# Patient Record
Sex: MALE | Race: WHITE | NOT HISPANIC OR LATINO | URBAN - METROPOLITAN AREA
[De-identification: names, ages, dates, MRNs, and addresses within clinical notes are randomized per-mention and may not be internally consistent; named-entity substitution may affect disease eponyms.]

---

## 2019-07-23 ENCOUNTER — IMPORTED ENCOUNTER (OUTPATIENT)
Dept: URBAN - METROPOLITAN AREA CLINIC 50 | Facility: CLINIC | Age: 59
End: 2019-07-23

## 2019-07-24 ENCOUNTER — IMPORTED ENCOUNTER (OUTPATIENT)
Dept: URBAN - METROPOLITAN AREA CLINIC 50 | Facility: CLINIC | Age: 59
End: 2019-07-24

## 2019-10-03 ENCOUNTER — IMPORTED ENCOUNTER (OUTPATIENT)
Dept: URBAN - METROPOLITAN AREA CLINIC 50 | Facility: CLINIC | Age: 59
End: 2019-10-03

## 2020-06-01 ENCOUNTER — IMPORTED ENCOUNTER (OUTPATIENT)
Dept: URBAN - METROPOLITAN AREA CLINIC 50 | Facility: CLINIC | Age: 60
End: 2020-06-01

## 2020-10-07 ENCOUNTER — IMPORTED ENCOUNTER (OUTPATIENT)
Dept: URBAN - METROPOLITAN AREA CLINIC 50 | Facility: CLINIC | Age: 60
End: 2020-10-07

## 2021-04-17 ASSESSMENT — VISUAL ACUITY
OS_CC: 20/20-1
OD_CC: 20/20
OS_CC: 20/25
OD_CC: 20/20

## 2021-04-17 ASSESSMENT — TONOMETRY
OS_IOP_MMHG: 16
OS_IOP_MMHG: 14
OD_IOP_MMHG: 16
OD_IOP_MMHG: 14

## 2021-12-09 ENCOUNTER — COMPREHENSIVE EXAM (OUTPATIENT)
Dept: URBAN - METROPOLITAN AREA CLINIC 49 | Facility: CLINIC | Age: 61
End: 2021-12-09

## 2021-12-09 DIAGNOSIS — H53.40: ICD-10-CM

## 2021-12-09 DIAGNOSIS — H25.13: ICD-10-CM

## 2021-12-09 DIAGNOSIS — H30.123: ICD-10-CM

## 2021-12-09 DIAGNOSIS — Z01.01: ICD-10-CM

## 2021-12-09 PROCEDURE — 92015 DETERMINE REFRACTIVE STATE: CPT

## 2021-12-09 PROCEDURE — 92014 COMPRE OPH EXAM EST PT 1/>: CPT

## 2021-12-09 ASSESSMENT — TONOMETRY
OD_IOP_MMHG: 13
OS_IOP_MMHG: 15

## 2021-12-09 ASSESSMENT — VISUAL ACUITY
OD_GLARE: 20/40
OD_PH: 20/30
OU_CC: J1+@16"
OD_GLARE: 20/60
OS_GLARE: 20/40
OS_GLARE: 20/30
OS_CC: 20/25-2
OD_CC: 20/40-2

## 2021-12-09 NOTE — PATIENT DISCUSSION
Discussed possible referral to [facility] for further evaluation by a neuro ophthalmologist to consider ordering a MRA or MRB. Will old off at this time.

## 2022-02-15 ENCOUNTER — DIAGNOSTICS ONLY (OUTPATIENT)
Dept: URBAN - METROPOLITAN AREA CLINIC 49 | Facility: CLINIC | Age: 62
End: 2022-02-15

## 2022-02-15 DIAGNOSIS — H53.40: ICD-10-CM

## 2022-02-15 PROCEDURE — 92083 EXTENDED VISUAL FIELD XM: CPT

## 2022-02-15 NOTE — PATIENT DISCUSSION
Discussed possible referral to [facility] for further evaluation by a neuro ophthalmologist to consider ordering a MRA or MRB. Will old off at this time.

## 2022-06-09 ENCOUNTER — COMPREHENSIVE EXAM (OUTPATIENT)
Dept: URBAN - METROPOLITAN AREA CLINIC 49 | Facility: CLINIC | Age: 62
End: 2022-06-09

## 2022-06-09 DIAGNOSIS — H53.40: ICD-10-CM

## 2022-06-09 DIAGNOSIS — H30.123: ICD-10-CM

## 2022-06-09 PROCEDURE — 92014 COMPRE OPH EXAM EST PT 1/>: CPT

## 2022-06-09 ASSESSMENT — VISUAL ACUITY
OD_CC: 20/20-2
OU_SC: J1+ @ 14"
OS_CC: 20/20

## 2022-06-09 ASSESSMENT — TONOMETRY
OS_IOP_MMHG: 14
OD_IOP_MMHG: 15

## 2022-06-09 NOTE — PATIENT DISCUSSION
Discussed possible referral to [facility] for further evaluation by a neuro ophthalmologist to consider ordering a MRA or MRB. Will old off at this time.

## 2022-06-09 NOTE — PATIENT DISCUSSION
HVF from 2/15/22 was unreliable. Repeat HVF next available. Patient will be called with the results.

## 2022-07-27 ENCOUNTER — DIAGNOSTICS ONLY (OUTPATIENT)
Dept: URBAN - METROPOLITAN AREA CLINIC 49 | Facility: CLINIC | Age: 62
End: 2022-07-27

## 2022-07-27 DIAGNOSIS — H53.40: ICD-10-CM

## 2022-07-27 PROCEDURE — 92083 EXTENDED VISUAL FIELD XM: CPT

## 2022-07-27 NOTE — PATIENT DISCUSSION
Discussed possible referral to [facility] for further evaluation by a neuro ophthalmologist to consider ordering a MRA or MRB. Will old off at this time.

## 2022-07-27 NOTE — PATIENT DISCUSSION
HVF from 2/15/22 was unreliable. Repeated HVF is also un reliable. Appears to have central islands of vision. Possibility of non organic cannot be excluded.

## 2024-08-28 ENCOUNTER — COMPREHENSIVE EXAM (OUTPATIENT)
Dept: URBAN - METROPOLITAN AREA CLINIC 49 | Facility: LOCATION | Age: 64
End: 2024-08-28

## 2024-08-28 DIAGNOSIS — H30.123: ICD-10-CM

## 2024-08-28 DIAGNOSIS — Z01.01: ICD-10-CM

## 2024-08-28 DIAGNOSIS — H02.831: ICD-10-CM

## 2024-08-28 DIAGNOSIS — H02.834: ICD-10-CM

## 2024-08-28 DIAGNOSIS — H25.13: ICD-10-CM

## 2024-08-28 DIAGNOSIS — H52.4: ICD-10-CM

## 2024-08-28 PROCEDURE — 92014 COMPRE OPH EXAM EST PT 1/>: CPT

## 2024-08-28 PROCEDURE — 92015 DETERMINE REFRACTIVE STATE: CPT

## 2024-08-28 ASSESSMENT — VISUAL ACUITY
OS_GLARE: 20/60
OS_CC: 20/40-2
OD_GLARE: 20/60
OD_CC: 20/30-2
OS_GLARE: 20/60
OD_GLARE: 20/50
OU_CC: J1+-2

## 2024-08-28 ASSESSMENT — TONOMETRY
OS_IOP_MMHG: 14
OD_IOP_MMHG: 14

## 2024-09-04 ENCOUNTER — PRE-OP/H&P (OUTPATIENT)
Dept: URBAN - METROPOLITAN AREA CLINIC 53 | Facility: CLINIC | Age: 64
End: 2024-09-04

## 2024-09-04 DIAGNOSIS — H25.13: ICD-10-CM

## 2024-09-04 DIAGNOSIS — H43.823: ICD-10-CM

## 2024-09-04 PROCEDURE — 92136 OPHTHALMIC BIOMETRY: CPT

## 2024-09-04 PROCEDURE — 92025-3 CORNEAL TOPO, REFUSED

## 2024-09-04 PROCEDURE — 92134 CPTRZ OPH DX IMG PST SGM RTA: CPT

## 2024-09-04 PROCEDURE — 99214 OFFICE O/P EST MOD 30 MIN: CPT

## 2024-09-12 ENCOUNTER — SURGERY/PROCEDURE (OUTPATIENT)
Dept: URBAN - METROPOLITAN AREA SURGERY 16 | Facility: SURGERY | Age: 64
End: 2024-09-12

## 2024-09-12 ENCOUNTER — POST-OP (OUTPATIENT)
Dept: URBAN - METROPOLITAN AREA CLINIC 48 | Facility: LOCATION | Age: 64
End: 2024-09-12

## 2024-09-12 DIAGNOSIS — Z98.41: ICD-10-CM

## 2024-09-12 DIAGNOSIS — H25.811: ICD-10-CM

## 2024-09-12 DIAGNOSIS — Z96.1: ICD-10-CM

## 2024-09-12 PROCEDURE — 99024 POSTOP FOLLOW-UP VISIT: CPT

## 2024-09-12 PROCEDURE — 99199PCV CUSTOM VISION

## 2024-09-12 PROCEDURE — 66984CV REMOVE CATARACT, INSERT LENS, CUSTOM VISION

## 2024-09-18 ENCOUNTER — POST-OP (OUTPATIENT)
Dept: URBAN - METROPOLITAN AREA CLINIC 53 | Facility: CLINIC | Age: 64
End: 2024-09-18

## 2024-09-18 DIAGNOSIS — Z96.1: ICD-10-CM

## 2024-09-18 DIAGNOSIS — H25.812: ICD-10-CM

## 2024-09-18 DIAGNOSIS — Z98.41: ICD-10-CM

## 2024-09-18 PROCEDURE — 99024 POSTOP FOLLOW-UP VISIT: CPT

## 2024-09-18 PROCEDURE — 92136NC IOLMASTER - NO CHARGE

## 2024-09-26 ENCOUNTER — SURGERY/PROCEDURE (OUTPATIENT)
Dept: URBAN - METROPOLITAN AREA SURGERY 16 | Facility: SURGERY | Age: 64
End: 2024-09-26

## 2024-09-26 ENCOUNTER — POST-OP (OUTPATIENT)
Dept: URBAN - METROPOLITAN AREA CLINIC 48 | Facility: LOCATION | Age: 64
End: 2024-09-26

## 2024-09-26 DIAGNOSIS — Z96.1: ICD-10-CM

## 2024-09-26 DIAGNOSIS — H25.812: ICD-10-CM

## 2024-09-26 DIAGNOSIS — Z98.42: ICD-10-CM

## 2024-09-26 PROCEDURE — 99199PCV CUSTOM VISION

## 2024-09-26 PROCEDURE — 99024 POSTOP FOLLOW-UP VISIT: CPT

## 2024-09-26 PROCEDURE — 66984CV REMOVE CATARACT, INSERT LENS, CUSTOM VISION: Mod: 79,LT

## 2024-10-02 ENCOUNTER — POST-OP (OUTPATIENT)
Dept: URBAN - METROPOLITAN AREA CLINIC 53 | Facility: CLINIC | Age: 64
End: 2024-10-02

## 2024-10-02 DIAGNOSIS — Z98.42: ICD-10-CM

## 2024-10-02 DIAGNOSIS — Z96.1: ICD-10-CM

## 2024-10-02 PROCEDURE — 99024 POSTOP FOLLOW-UP VISIT: CPT

## 2024-10-30 ENCOUNTER — POST-OP (OUTPATIENT)
Dept: URBAN - METROPOLITAN AREA CLINIC 53 | Facility: CLINIC | Age: 64
End: 2024-10-30

## 2024-10-30 DIAGNOSIS — Z98.42: ICD-10-CM

## 2024-10-30 DIAGNOSIS — Z98.41: ICD-10-CM

## 2024-10-30 PROCEDURE — 99024 POSTOP FOLLOW-UP VISIT: CPT
